# Patient Record
Sex: FEMALE | Race: BLACK OR AFRICAN AMERICAN | NOT HISPANIC OR LATINO | URBAN - METROPOLITAN AREA
[De-identification: names, ages, dates, MRNs, and addresses within clinical notes are randomized per-mention and may not be internally consistent; named-entity substitution may affect disease eponyms.]

---

## 2022-06-20 ENCOUNTER — APPOINTMENT (RX ONLY)
Dept: URBAN - METROPOLITAN AREA CLINIC 332 | Facility: CLINIC | Age: 47
Setting detail: DERMATOLOGY
End: 2022-06-20

## 2022-06-20 NOTE — HPI: OTHER
Condition:: Patient chart opened in error. She was here for PRP consult which we do not offer at this office.
Please Describe Your Condition:: Patient was here for PRP consult which is not offered in this office.